# Patient Record
Sex: MALE | URBAN - METROPOLITAN AREA
[De-identification: names, ages, dates, MRNs, and addresses within clinical notes are randomized per-mention and may not be internally consistent; named-entity substitution may affect disease eponyms.]

---

## 2020-09-15 ENCOUNTER — WEB ENCOUNTER (OUTPATIENT)
Dept: URBAN - METROPOLITAN AREA CLINIC 35 | Facility: CLINIC | Age: 65
End: 2020-09-15

## 2020-09-17 ENCOUNTER — OFFICE VISIT (OUTPATIENT)
Dept: URBAN - METROPOLITAN AREA CLINIC 31 | Facility: CLINIC | Age: 65
End: 2020-09-17

## 2020-09-17 VITALS
DIASTOLIC BLOOD PRESSURE: 94 MMHG | HEART RATE: 99 BPM | BODY MASS INDEX: 28.63 KG/M2 | HEIGHT: 70 IN | WEIGHT: 200 LBS | SYSTOLIC BLOOD PRESSURE: 144 MMHG

## 2020-09-17 RX ORDER — GUAIFENESIN 600 MG/1
1 TABLET AS NEEDED TABLET, EXTENDED RELEASE ORAL
Status: ACTIVE | COMMUNITY

## 2020-09-17 RX ORDER — SODIUM, POTASSIUM,MAG SULFATES 17.5-3.13G
ML AS DIRECTED SOLUTION, RECONSTITUTED, ORAL ORAL
Qty: 1 KIT | Refills: 0 | OUTPATIENT
Start: 2020-09-17

## 2020-09-17 RX ORDER — ROSUVASTATIN CALCIUM 10 MG/1
1 TABLET TABLET, FILM COATED ORAL ONCE A DAY
Qty: 30 | Status: ACTIVE | COMMUNITY

## 2020-09-17 RX ORDER — AMLODIPINE BESYLATE 10 MG/1
1 TABLET TABLET ORAL ONCE A DAY
Qty: 30 | Status: ACTIVE | COMMUNITY

## 2020-09-17 NOTE — HPI-MIGRATED HPI
;     Elevated Liver Enzymes : Patient presents today for consultation about recent elevated liver enzymes.  Patient admits a history of elevated liver enzymes. Patient currently denies jaundice, chills, RUQ pains, dizziness, easy bruising. He admits fatigue.    RISK FACTORS:Admits: Alcohol use, NSAID's, protein supplements, tattoos,   Denies drugs, travel outside the [place], piercing's,  service, blood transfusion, family history of liver disease or cancer, personal exposure to liver diseases, shelter, rehab  Ultrasound of the RUQ was ordered by Dr. Kranthi Beltre, performed on 07/30/2020 showing abnormal findings of: Hepatomegaly and diffuse increased hepatic echogenicity suggestive of steatosis or other hepatocellular disease.  Most recent labs were performed 07/14/2020 showing abnormal findings of: Ferritin: 513 (High), Glucose: 118 (High), ALT: 139 (High), AST: 113 (High), WBC: 3.5 (Low), Platelet count: 133 (Low), Cholesterol: 235 (High), Triglycerides: 432 (High), Cholesterol/ HDL Ratio: 5.22 (High), Non-HDL Cholesterol: 190 (High);

## 2020-09-22 ENCOUNTER — WEB ENCOUNTER (OUTPATIENT)
Dept: URBAN - METROPOLITAN AREA CLINIC 35 | Facility: CLINIC | Age: 65
End: 2020-09-22

## 2020-09-24 ENCOUNTER — OFFICE VISIT (OUTPATIENT)
Dept: URBAN - METROPOLITAN AREA CLINIC 35 | Facility: CLINIC | Age: 65
End: 2020-09-24

## 2020-09-28 ENCOUNTER — OFFICE VISIT (OUTPATIENT)
Dept: URBAN - METROPOLITAN AREA SURGERY CENTER 8 | Facility: SURGERY CENTER | Age: 65
End: 2020-09-28

## 2020-09-28 RX ORDER — ROSUVASTATIN CALCIUM 10 MG/1
1 TABLET TABLET, FILM COATED ORAL ONCE A DAY
Qty: 30 | Status: ACTIVE | COMMUNITY

## 2020-09-28 RX ORDER — GUAIFENESIN 600 MG/1
1 TABLET AS NEEDED TABLET, EXTENDED RELEASE ORAL
Status: ACTIVE | COMMUNITY

## 2020-09-28 RX ORDER — SODIUM, POTASSIUM,MAG SULFATES 17.5-3.13G
ML AS DIRECTED SOLUTION, RECONSTITUTED, ORAL ORAL
Qty: 1 KIT | Refills: 0 | Status: ACTIVE | COMMUNITY
Start: 2020-09-17

## 2020-09-28 RX ORDER — AMLODIPINE BESYLATE 10 MG/1
1 TABLET TABLET ORAL ONCE A DAY
Qty: 30 | Status: ACTIVE | COMMUNITY

## 2020-10-15 ENCOUNTER — OFFICE VISIT (OUTPATIENT)
Dept: URBAN - METROPOLITAN AREA CLINIC 31 | Facility: CLINIC | Age: 65
End: 2020-10-15

## 2020-10-15 VITALS
HEIGHT: 70 IN | BODY MASS INDEX: 28.63 KG/M2 | HEART RATE: 95 BPM | SYSTOLIC BLOOD PRESSURE: 140 MMHG | DIASTOLIC BLOOD PRESSURE: 90 MMHG | WEIGHT: 200 LBS

## 2020-10-15 RX ORDER — AMLODIPINE BESYLATE 10 MG/1
1 TABLET TABLET ORAL ONCE A DAY
Qty: 30 | Status: ACTIVE | COMMUNITY

## 2020-10-15 RX ORDER — SODIUM, POTASSIUM,MAG SULFATES 17.5-3.13G
ML AS DIRECTED SOLUTION, RECONSTITUTED, ORAL ORAL
Qty: 1 KIT | Refills: 0 | Status: ON HOLD | COMMUNITY
Start: 2020-09-17

## 2020-10-15 RX ORDER — GUAIFENESIN 600 MG/1
1 TABLET AS NEEDED TABLET, EXTENDED RELEASE ORAL
Status: ACTIVE | COMMUNITY

## 2020-10-15 RX ORDER — ROSUVASTATIN CALCIUM 10 MG/1
1 TABLET TABLET, FILM COATED ORAL ONCE A DAY
Qty: 30 | Status: ACTIVE | COMMUNITY

## 2020-10-15 NOTE — HPI-MIGRATED HPI
;   ;     Colonoscopy Follow-Up : Patient presents today for follow up to his colonoscopy which was done on (9/28/2020) by Dr. Dioni Allen.  Patient denies any complications after his procedure. Patient currently admits 1 bowel movement per day.  Stools are normal and formed.  Patient denies any melena, blood or mucus in stools.   ;   Elevated Liver Enzymes : Patient currently denies jaundice, chills, RUQ pains, dizziness, easy bruising. He admits fatigue.     Last visit (9/17/2020)   Patient presents today for consultation about recent elevated liver enzymes.  Patient admits a history of elevated liver enzymes. Patient currently denies jaundice, chills, RUQ pains, dizziness, easy bruising. He admits fatigue.    RISK FACTORS:Admits: Alcohol use, NSAID's, protein supplements, tattoos,   Denies drugs, travel outside the [place], piercing's,  service, blood transfusion, family history of liver disease or cancer, personal exposure to liver diseases, snf, rehab  Ultrasound of the RUQ was ordered by Dr. Kranthi eBltre, performed on 07/30/2020 showing abnormal findings of: Hepatomegaly and diffuse increased hepatic echogenicity suggestive of steatosis or other hepatocellular disease.  Most recent labs were performed 07/14/2020 showing abnormal findings of: Ferritin: 513 (High), Glucose: 118 (High), ALT: 139 (High), AST: 113 (High), WBC: 3.5 (Low), Platelet count: 133 (Low), Cholesterol: 235 (High), Triglycerides: 432 (High), Cholesterol/ HDL Ratio: 5.22 (High), Non-HDL Cholesterol: 190 (High);

## 2020-10-15 NOTE — EXAM-MIGRATED EXAMINATIONS
GENERAL APPEARANCE: - alert, no acute distress,  well developed, well nourished;   HEAD: - normocephalic, atraumatic;   EYES: - sclera anicteric bilaterally;   ORAL CAVITY: - mucosa moist;   THROAT: - clear;   HEART: - no murmurs, regular rate and rhythm, S1, S2 normal;   LUNGS: - clear to auscultation bilaterally, good air movement, no wheezes, rales, rhonchi;   ABDOMEN: - bowel sounds present, no masses palpable, no organomegaly , no rebound tenderness, soft, nontender, nondistended;   MUSCULOSKELETAL: - normal posture, normal gait and station, no decreased range of motion;   EXTREMITIES: - no clubbing, cyanosis or edema;   PSYCH: - cooperative with exam, mood/affect full range;

## 2020-10-22 ENCOUNTER — OFFICE VISIT (OUTPATIENT)
Dept: URBAN - METROPOLITAN AREA CLINIC 35 | Facility: CLINIC | Age: 65
End: 2020-10-22

## 2020-10-27 ENCOUNTER — OFFICE VISIT (OUTPATIENT)
Dept: URBAN - METROPOLITAN AREA CLINIC 35 | Facility: CLINIC | Age: 65
End: 2020-10-27

## 2020-11-10 ENCOUNTER — OFFICE VISIT (OUTPATIENT)
Dept: URBAN - METROPOLITAN AREA CLINIC 35 | Facility: CLINIC | Age: 65
End: 2020-11-10

## 2020-11-24 ENCOUNTER — TELEPHONE ENCOUNTER (OUTPATIENT)
Dept: URBAN - METROPOLITAN AREA CLINIC 35 | Facility: CLINIC | Age: 65
End: 2020-11-24

## 2020-12-09 ENCOUNTER — OFFICE VISIT (OUTPATIENT)
Dept: URBAN - METROPOLITAN AREA CLINIC 31 | Facility: CLINIC | Age: 65
End: 2020-12-09

## 2020-12-09 NOTE — HPI-MIGRATED HPI
;   ;     Colonoscopy Follow-Up : Patient presents today for follow up to his colonoscopy which was done on (9/28/2020) by Dr. Dioni Allen.  Patient denies any complications after his procedure. Patient currently admits 1 bowel movement per day.  Stools are normal and formed.  Patient denies any melena, blood or mucus in stools.   ;   Elevated Liver Enzymes :      Last visit (10/15/2020)    Patient currently denies jaundice, chills, RUQ pains, dizziness, easy bruising. He admits fatigue.    Last visit (9/17/2020)   Patient presents today for consultation about recent elevated liver enzymes.  Patient admits a history of elevated liver enzymes. Patient currently denies jaundice, chills, RUQ pains, dizziness, easy bruising. He admits fatigue.    RISK FACTORS:Admits: Alcohol use, NSAID's, protein supplements, tattoos,   Denies drugs, travel outside the [place], piercing's,  service, blood transfusion, family history of liver disease or cancer, personal exposure to liver diseases, [facility], rehab  Ultrasound of the RUQ was ordered by Dr. Kranthi Beltre, performed on 07/30/2020 showing abnormal findings of: Hepatomegaly and diffuse increased hepatic echogenicity suggestive of steatosis or other hepatocellular disease.  Most recent labs were performed 07/14/2020 showing abnormal findings of: Ferritin: 513 (High), Glucose: 118 (High), ALT: 139 (High), AST: 113 (High), WBC: 3.5 (Low), Platelet count: 133 (Low), Cholesterol: 235 (High), Triglycerides: 432 (High), Cholesterol/ HDL Ratio: 5.22 (High), Non-HDL Cholesterol: 190 (High);